# Patient Record
Sex: FEMALE | Race: WHITE | URBAN - METROPOLITAN AREA
[De-identification: names, ages, dates, MRNs, and addresses within clinical notes are randomized per-mention and may not be internally consistent; named-entity substitution may affect disease eponyms.]

---

## 2021-12-15 ENCOUNTER — OFFICE VISIT (OUTPATIENT)
Dept: URBAN - METROPOLITAN AREA CLINIC 76 | Facility: CLINIC | Age: 66
End: 2021-12-15
Payer: COMMERCIAL

## 2021-12-15 DIAGNOSIS — H02.889 MGD OF EYE: ICD-10-CM

## 2021-12-15 DIAGNOSIS — H52.4 PRESBYOPIA: Primary | ICD-10-CM

## 2021-12-15 DIAGNOSIS — H25.13 AGE-RELATED NUCLEAR CATARACT, BILATERAL: ICD-10-CM

## 2021-12-15 DIAGNOSIS — H35.54 DYSTROPHIES PRIMARILY INVOLVING THE RETINAL PIGMENT EPITHELIUM: ICD-10-CM

## 2021-12-15 PROCEDURE — 92004 COMPRE OPH EXAM NEW PT 1/>: CPT | Performed by: OPTOMETRIST

## 2021-12-15 ASSESSMENT — KERATOMETRY
OS: 44.75
OD: 44.88

## 2021-12-15 ASSESSMENT — INTRAOCULAR PRESSURE
OD: 16
OS: 17

## 2021-12-15 ASSESSMENT — VISUAL ACUITY
OS: 20/20
OD: 20/20

## 2021-12-15 NOTE — IMPRESSION/PLAN
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 Plan: Discussed diagnosis in detail with patient. Counseling given about the benefits and/or risks of the Age-Related Eye Disease Study (AREDS) formulation for preventing progression of age-related macular degeneration (AMD). Add lutein 20-30 mg, zeaxanthin 2-5mg per day with MV or AREDS 2 MV PO daily as directed. Will continue to observe condition and or symptoms. Call if [address] worsens. Dispensed and explained use of [employer] grid.